# Patient Record
Sex: FEMALE | URBAN - METROPOLITAN AREA
[De-identification: names, ages, dates, MRNs, and addresses within clinical notes are randomized per-mention and may not be internally consistent; named-entity substitution may affect disease eponyms.]

---

## 2019-01-24 ENCOUNTER — APPOINTMENT (RX ONLY)
Dept: URBAN - METROPOLITAN AREA CLINIC 9 | Facility: CLINIC | Age: 54
Setting detail: DERMATOLOGY
End: 2019-01-24

## 2019-01-24 DIAGNOSIS — Z41.9 ENCOUNTER FOR PROCEDURE FOR PURPOSES OTHER THAN REMEDYING HEALTH STATE, UNSPECIFIED: ICD-10-CM

## 2019-01-24 PROCEDURE — ? OTHER

## 2019-01-24 PROCEDURE — ? BOTOX

## 2019-01-24 NOTE — PROCEDURE: BOTOX
Additional Area 6 Units: 0
Price (Use Numbers Only, No Special Characters Or $): 571
Post-Care Instructions: Patient instructed to not lie down for 4 hours and limit physical activity for 24 hours. Patient instructed not to travel by airplane for 48 hours.
Dilution (U/0.1 Cc): 4
Detail Level: Simple
Expiration Date (Month Year): 03/21
Lot #: [id number]
Additional Area 1 Location: glabella forehead
Consent: Written consent obtained. Risks include but not limited to lid/brow ptosis, bruising, swelling, diplopia, temporary effect, incomplete chemical denervation.

## 2019-01-24 NOTE — PROCEDURE: OTHER
Detail Level: Zone
Other (Free Text): 24 units
Note Text (......Xxx Chief Complaint.): This diagnosis correlates with the

## 2019-12-05 ENCOUNTER — APPOINTMENT (RX ONLY)
Dept: URBAN - METROPOLITAN AREA CLINIC 42 | Facility: CLINIC | Age: 54
Setting detail: DERMATOLOGY
End: 2019-12-05

## 2019-12-05 DIAGNOSIS — D18.0 HEMANGIOMA: ICD-10-CM

## 2019-12-05 DIAGNOSIS — L82.1 OTHER SEBORRHEIC KERATOSIS: ICD-10-CM

## 2019-12-05 DIAGNOSIS — L72.8 OTHER FOLLICULAR CYSTS OF THE SKIN AND SUBCUTANEOUS TISSUE: ICD-10-CM

## 2019-12-05 DIAGNOSIS — D22 MELANOCYTIC NEVI: ICD-10-CM

## 2019-12-05 PROBLEM — D22.5 MELANOCYTIC NEVI OF TRUNK: Status: ACTIVE | Noted: 2019-12-05

## 2019-12-05 PROBLEM — D18.01 HEMANGIOMA OF SKIN AND SUBCUTANEOUS TISSUE: Status: ACTIVE | Noted: 2019-12-05

## 2019-12-05 PROCEDURE — 99214 OFFICE O/P EST MOD 30 MIN: CPT

## 2019-12-05 PROCEDURE — ? COUNSELING

## 2019-12-05 ASSESSMENT — LOCATION DETAILED DESCRIPTION DERM
LOCATION DETAILED: LEFT MID-UPPER BACK
LOCATION DETAILED: RIGHT INFERIOR UPPER BACK
LOCATION DETAILED: LEFT INFERIOR MEDIAL UPPER BACK
LOCATION DETAILED: RIGHT MEDIAL UPPER BACK

## 2019-12-05 ASSESSMENT — LOCATION ZONE DERM: LOCATION ZONE: TRUNK

## 2019-12-05 ASSESSMENT — LOCATION SIMPLE DESCRIPTION DERM
LOCATION SIMPLE: LEFT UPPER BACK
LOCATION SIMPLE: RIGHT UPPER BACK

## 2022-02-21 ENCOUNTER — PREPPED CHART (OUTPATIENT)
Dept: URBAN - METROPOLITAN AREA CLINIC 67 | Facility: CLINIC | Age: 57
End: 2022-02-21

## 2022-02-21 PROBLEM — Z79.899 LONG-TERM/CURRENT USE OF OTHER HIGH RISK MEDICATION: Noted: 2022-02-21

## 2022-02-21 PROBLEM — H35.411 TREATED LATTICE DEGENERATION: Noted: 2022-02-21

## 2022-02-21 PROBLEM — H33.311 RETINAL TEAR: Noted: 2022-02-21

## 2022-02-21 PROBLEM — H33.311 TREATED HORSESHOE TEAR: Noted: 2022-02-21

## 2022-02-21 PROBLEM — H43.392 VITREOUS FLOATERS: Noted: 2022-02-21

## 2022-02-21 PROBLEM — H43.393 VITREOUS FLOATERS: Noted: 2022-02-21

## 2022-02-21 PROBLEM — H35.411 LATTICE DEGENERATION OF RETINA: Noted: 2022-02-21

## 2022-11-03 ASSESSMENT — TONOMETRY
OS_IOP_MMHG: 13
OD_IOP_MMHG: 12

## 2022-11-03 ASSESSMENT — VISUAL ACUITY
OD_CC: 20/25+2
OS_CC: 20/20

## 2023-02-13 ENCOUNTER — 1 YEAR COMPLETE EXAM (OUTPATIENT)
Dept: URBAN - METROPOLITAN AREA CLINIC 67 | Facility: CLINIC | Age: 58
End: 2023-02-13

## 2023-02-13 DIAGNOSIS — H35.411: ICD-10-CM

## 2023-02-13 DIAGNOSIS — Z79.899: ICD-10-CM

## 2023-02-13 DIAGNOSIS — H43.392: ICD-10-CM

## 2023-02-13 DIAGNOSIS — H33.311: ICD-10-CM

## 2023-02-13 DIAGNOSIS — H43.811: ICD-10-CM

## 2023-02-13 PROCEDURE — 92014 COMPRE OPH EXAM EST PT 1/>: CPT

## 2023-02-13 PROCEDURE — 92201 OPSCPY EXTND RTA DRAW UNI/BI: CPT

## 2023-02-13 PROCEDURE — 92134 CPTRZ OPH DX IMG PST SGM RTA: CPT

## 2023-02-13 PROCEDURE — 92250 FUNDUS PHOTOGRAPHY W/I&R: CPT | Mod: NC

## 2023-02-13 ASSESSMENT — VISUAL ACUITY
OD_CC: 20/20-2
OS_CC: 20/20-1

## 2023-02-13 ASSESSMENT — TONOMETRY
OS_IOP_MMHG: 15
OD_IOP_MMHG: 14

## 2024-02-12 ENCOUNTER — FOLLOW UP (OUTPATIENT)
Dept: URBAN - METROPOLITAN AREA CLINIC 67 | Facility: CLINIC | Age: 59
End: 2024-02-12

## 2024-02-12 DIAGNOSIS — Z79.899: ICD-10-CM

## 2024-02-12 DIAGNOSIS — H43.811: ICD-10-CM

## 2024-02-12 DIAGNOSIS — H33.311: ICD-10-CM

## 2024-02-12 DIAGNOSIS — H35.411: ICD-10-CM

## 2024-02-12 DIAGNOSIS — H43.392: ICD-10-CM

## 2024-02-12 PROCEDURE — 92202 OPSCPY EXTND ON/MAC DRAW: CPT

## 2024-02-12 PROCEDURE — 92134 CPTRZ OPH DX IMG PST SGM RTA: CPT

## 2024-02-12 PROCEDURE — 92250 FUNDUS PHOTOGRAPHY W/I&R: CPT | Mod: NC

## 2024-02-12 PROCEDURE — 92014 COMPRE OPH EXAM EST PT 1/>: CPT

## 2024-02-12 ASSESSMENT — TONOMETRY
OS_IOP_MMHG: 17
OD_IOP_MMHG: 15

## 2024-02-12 ASSESSMENT — VISUAL ACUITY
OS_CC: 20/20-1
OD_CC: 20/20-2

## 2025-02-17 ENCOUNTER — FOLLOW UP (OUTPATIENT)
Dept: URBAN - METROPOLITAN AREA CLINIC 67 | Facility: CLINIC | Age: 60
End: 2025-02-17

## 2025-02-17 DIAGNOSIS — H43.811: ICD-10-CM

## 2025-02-17 DIAGNOSIS — H33.311: ICD-10-CM

## 2025-02-17 DIAGNOSIS — H35.411: ICD-10-CM

## 2025-02-17 DIAGNOSIS — H43.392: ICD-10-CM

## 2025-02-17 DIAGNOSIS — Z79.899: ICD-10-CM

## 2025-02-17 PROCEDURE — 92134 CPTRZ OPH DX IMG PST SGM RTA: CPT

## 2025-02-17 PROCEDURE — 92202 OPSCPY EXTND ON/MAC DRAW: CPT

## 2025-02-17 PROCEDURE — 92250 FUNDUS PHOTOGRAPHY W/I&R: CPT | Mod: NC

## 2025-02-17 PROCEDURE — 92014 COMPRE OPH EXAM EST PT 1/>: CPT

## 2025-02-17 ASSESSMENT — VISUAL ACUITY
OS_CC: 20/20
OD_CC: 20/25-1

## 2025-02-17 ASSESSMENT — TONOMETRY
OD_IOP_MMHG: 14
OS_IOP_MMHG: 15